# Patient Record
Sex: FEMALE | Race: WHITE
[De-identification: names, ages, dates, MRNs, and addresses within clinical notes are randomized per-mention and may not be internally consistent; named-entity substitution may affect disease eponyms.]

---

## 2017-01-09 ENCOUNTER — HOSPITAL ENCOUNTER (INPATIENT)
Dept: HOSPITAL 19 - LDRO | Age: 27
LOS: 30 days | Discharge: HOME | End: 2017-02-08
Attending: OBSTETRICS & GYNECOLOGY | Admitting: OBSTETRICS & GYNECOLOGY
Payer: COMMERCIAL

## 2017-01-09 VITALS — HEIGHT: 67.01 IN | BODY MASS INDEX: 31.18 KG/M2 | WEIGHT: 198.64 LBS

## 2017-01-09 DIAGNOSIS — Z3A.38: ICD-10-CM

## 2017-01-09 DIAGNOSIS — O09.293: ICD-10-CM

## 2017-02-06 VITALS — HEART RATE: 101 BPM | DIASTOLIC BLOOD PRESSURE: 62 MMHG | SYSTOLIC BLOOD PRESSURE: 116 MMHG | TEMPERATURE: 98 F

## 2017-02-06 VITALS — TEMPERATURE: 98.3 F | DIASTOLIC BLOOD PRESSURE: 83 MMHG | HEART RATE: 121 BPM | SYSTOLIC BLOOD PRESSURE: 123 MMHG

## 2017-02-06 VITALS — SYSTOLIC BLOOD PRESSURE: 128 MMHG | DIASTOLIC BLOOD PRESSURE: 81 MMHG | HEART RATE: 90 BPM

## 2017-02-06 VITALS — HEART RATE: 90 BPM | DIASTOLIC BLOOD PRESSURE: 78 MMHG | SYSTOLIC BLOOD PRESSURE: 111 MMHG

## 2017-02-06 VITALS — DIASTOLIC BLOOD PRESSURE: 61 MMHG | HEART RATE: 96 BPM | TEMPERATURE: 97.9 F | SYSTOLIC BLOOD PRESSURE: 113 MMHG

## 2017-02-06 VITALS — DIASTOLIC BLOOD PRESSURE: 78 MMHG | HEART RATE: 93 BPM | SYSTOLIC BLOOD PRESSURE: 120 MMHG

## 2017-02-06 VITALS — TEMPERATURE: 98 F | DIASTOLIC BLOOD PRESSURE: 85 MMHG | SYSTOLIC BLOOD PRESSURE: 129 MMHG | HEART RATE: 89 BPM

## 2017-02-06 VITALS — HEART RATE: 77 BPM | SYSTOLIC BLOOD PRESSURE: 127 MMHG | DIASTOLIC BLOOD PRESSURE: 81 MMHG

## 2017-02-06 VITALS — SYSTOLIC BLOOD PRESSURE: 130 MMHG | DIASTOLIC BLOOD PRESSURE: 78 MMHG | HEART RATE: 90 BPM

## 2017-02-06 VITALS — SYSTOLIC BLOOD PRESSURE: 121 MMHG | DIASTOLIC BLOOD PRESSURE: 79 MMHG | HEART RATE: 93 BPM

## 2017-02-06 VITALS — HEART RATE: 78 BPM | DIASTOLIC BLOOD PRESSURE: 72 MMHG | SYSTOLIC BLOOD PRESSURE: 115 MMHG

## 2017-02-06 VITALS — SYSTOLIC BLOOD PRESSURE: 103 MMHG | HEART RATE: 90 BPM | DIASTOLIC BLOOD PRESSURE: 71 MMHG

## 2017-02-06 VITALS — HEART RATE: 90 BPM | DIASTOLIC BLOOD PRESSURE: 75 MMHG | SYSTOLIC BLOOD PRESSURE: 121 MMHG

## 2017-02-06 VITALS — HEART RATE: 100 BPM | SYSTOLIC BLOOD PRESSURE: 117 MMHG | DIASTOLIC BLOOD PRESSURE: 80 MMHG

## 2017-02-06 VITALS — HEART RATE: 91 BPM | SYSTOLIC BLOOD PRESSURE: 124 MMHG | TEMPERATURE: 97.7 F | DIASTOLIC BLOOD PRESSURE: 80 MMHG

## 2017-02-06 VITALS — DIASTOLIC BLOOD PRESSURE: 67 MMHG | HEART RATE: 104 BPM | SYSTOLIC BLOOD PRESSURE: 134 MMHG

## 2017-02-06 VITALS — DIASTOLIC BLOOD PRESSURE: 65 MMHG | TEMPERATURE: 98.9 F | HEART RATE: 115 BPM | SYSTOLIC BLOOD PRESSURE: 101 MMHG

## 2017-02-06 VITALS — DIASTOLIC BLOOD PRESSURE: 73 MMHG | HEART RATE: 90 BPM | SYSTOLIC BLOOD PRESSURE: 124 MMHG

## 2017-02-06 VITALS — TEMPERATURE: 98.3 F | SYSTOLIC BLOOD PRESSURE: 123 MMHG | HEART RATE: 121 BPM | DIASTOLIC BLOOD PRESSURE: 83 MMHG

## 2017-02-06 VITALS — SYSTOLIC BLOOD PRESSURE: 128 MMHG | DIASTOLIC BLOOD PRESSURE: 70 MMHG | HEART RATE: 103 BPM

## 2017-02-06 VITALS — HEART RATE: 92 BPM | SYSTOLIC BLOOD PRESSURE: 127 MMHG | DIASTOLIC BLOOD PRESSURE: 80 MMHG

## 2017-02-06 VITALS — DIASTOLIC BLOOD PRESSURE: 77 MMHG | SYSTOLIC BLOOD PRESSURE: 118 MMHG | HEART RATE: 83 BPM

## 2017-02-06 VITALS — DIASTOLIC BLOOD PRESSURE: 83 MMHG | SYSTOLIC BLOOD PRESSURE: 121 MMHG | HEART RATE: 98 BPM

## 2017-02-06 VITALS — HEART RATE: 96 BPM | DIASTOLIC BLOOD PRESSURE: 73 MMHG | SYSTOLIC BLOOD PRESSURE: 124 MMHG

## 2017-02-06 VITALS — DIASTOLIC BLOOD PRESSURE: 75 MMHG | SYSTOLIC BLOOD PRESSURE: 121 MMHG | HEART RATE: 81 BPM

## 2017-02-06 VITALS — SYSTOLIC BLOOD PRESSURE: 119 MMHG | HEART RATE: 99 BPM | DIASTOLIC BLOOD PRESSURE: 81 MMHG

## 2017-02-06 VITALS — SYSTOLIC BLOOD PRESSURE: 115 MMHG | DIASTOLIC BLOOD PRESSURE: 74 MMHG | HEART RATE: 81 BPM

## 2017-02-06 VITALS — HEART RATE: 101 BPM | DIASTOLIC BLOOD PRESSURE: 77 MMHG | SYSTOLIC BLOOD PRESSURE: 117 MMHG

## 2017-02-06 VITALS — SYSTOLIC BLOOD PRESSURE: 116 MMHG | DIASTOLIC BLOOD PRESSURE: 74 MMHG | HEART RATE: 94 BPM

## 2017-02-06 VITALS — HEART RATE: 88 BPM | SYSTOLIC BLOOD PRESSURE: 109 MMHG | DIASTOLIC BLOOD PRESSURE: 74 MMHG | TEMPERATURE: 97.4 F

## 2017-02-06 VITALS — DIASTOLIC BLOOD PRESSURE: 76 MMHG | HEART RATE: 91 BPM | SYSTOLIC BLOOD PRESSURE: 119 MMHG

## 2017-02-06 VITALS — SYSTOLIC BLOOD PRESSURE: 114 MMHG | HEART RATE: 87 BPM | DIASTOLIC BLOOD PRESSURE: 74 MMHG

## 2017-02-06 VITALS — DIASTOLIC BLOOD PRESSURE: 63 MMHG | SYSTOLIC BLOOD PRESSURE: 137 MMHG | TEMPERATURE: 98 F | HEART RATE: 88 BPM

## 2017-02-06 VITALS — DIASTOLIC BLOOD PRESSURE: 75 MMHG | SYSTOLIC BLOOD PRESSURE: 129 MMHG | HEART RATE: 82 BPM

## 2017-02-06 LAB
BASOPHILS # BLD: 0 10*3/UL (ref 0–0.2)
BASOPHILS NFR BLD AUTO: 0.4 % (ref 0–2)
EOSINOPHIL # BLD: 0.1 10*3/UL (ref 0–0.7)
EOSINOPHIL NFR BLD: 1.2 % (ref 0–4)
ERYTHROCYTE [DISTWIDTH] IN BLOOD BY AUTOMATED COUNT: 13.6 % (ref 11.5–14.5)
GRANULOCYTES # BLD AUTO: 67.1 % (ref 42.2–75.2)
HCT VFR BLD AUTO: 36.6 % (ref 37–47)
HGB BLD-MCNC: 12.7 G/DL (ref 12.5–16)
LYMPHOCYTES # BLD: 1.9 10*3/UL (ref 1.2–3.4)
LYMPHOCYTES NFR BLD: 20.3 % (ref 20–51)
MCH RBC QN AUTO: 33 PG (ref 27–31)
MCHC RBC AUTO-ENTMCNC: 35 G/DL (ref 33–37)
MCV RBC AUTO: 95 FL (ref 80–100)
MONOCYTES # BLD: 0.9 10*3/UL (ref 0.1–0.6)
MONOCYTES NFR BLD AUTO: 9.8 % (ref 1.7–9.3)
NEUTROPHILS # BLD: 6.2 10*3/UL (ref 1.4–6.5)
PLATELET # BLD AUTO: 187 K/MM3 (ref 130–400)
PMV BLD AUTO: 11.4 FL (ref 7.4–10.4)
RBC # BLD AUTO: 3.86 M/MM3 (ref 4.1–5.3)
WBC # BLD AUTO: 9.2 K/MM3 (ref 4.8–10.8)

## 2017-02-06 PROCEDURE — 0HQ9XZZ REPAIR PERINEUM SKIN, EXTERNAL APPROACH: ICD-10-PCS | Performed by: OBSTETRICS & GYNECOLOGY

## 2017-02-06 PROCEDURE — 3E033VJ INTRODUCTION OF OTHER HORMONE INTO PERIPHERAL VEIN, PERCUTANEOUS APPROACH: ICD-10-PCS | Performed by: OBSTETRICS & GYNECOLOGY

## 2017-02-07 VITALS — TEMPERATURE: 97.8 F | HEART RATE: 92 BPM | SYSTOLIC BLOOD PRESSURE: 114 MMHG | DIASTOLIC BLOOD PRESSURE: 68 MMHG

## 2017-02-07 VITALS — DIASTOLIC BLOOD PRESSURE: 79 MMHG | SYSTOLIC BLOOD PRESSURE: 106 MMHG | TEMPERATURE: 98.1 F | HEART RATE: 84 BPM

## 2017-02-07 VITALS — TEMPERATURE: 97.8 F | SYSTOLIC BLOOD PRESSURE: 110 MMHG | HEART RATE: 85 BPM | DIASTOLIC BLOOD PRESSURE: 79 MMHG

## 2017-02-07 VITALS — SYSTOLIC BLOOD PRESSURE: 109 MMHG | DIASTOLIC BLOOD PRESSURE: 68 MMHG | TEMPERATURE: 97.9 F | HEART RATE: 87 BPM

## 2017-02-07 VITALS — TEMPERATURE: 98.2 F | DIASTOLIC BLOOD PRESSURE: 75 MMHG | SYSTOLIC BLOOD PRESSURE: 102 MMHG | HEART RATE: 83 BPM

## 2017-02-08 VITALS — HEART RATE: 104 BPM | DIASTOLIC BLOOD PRESSURE: 73 MMHG | TEMPERATURE: 98.2 F | SYSTOLIC BLOOD PRESSURE: 98 MMHG

## 2018-12-16 ENCOUNTER — HOSPITAL ENCOUNTER (OUTPATIENT)
Dept: HOSPITAL 19 - LDRO | Age: 28
Discharge: HOME | End: 2018-12-16
Attending: OBSTETRICS & GYNECOLOGY
Payer: COMMERCIAL

## 2018-12-16 VITALS — HEART RATE: 108 BPM | DIASTOLIC BLOOD PRESSURE: 77 MMHG | SYSTOLIC BLOOD PRESSURE: 117 MMHG

## 2018-12-16 VITALS — DIASTOLIC BLOOD PRESSURE: 76 MMHG | SYSTOLIC BLOOD PRESSURE: 113 MMHG | HEART RATE: 116 BPM | TEMPERATURE: 98.3 F

## 2018-12-16 VITALS — HEIGHT: 67.01 IN | WEIGHT: 187.39 LBS | BODY MASS INDEX: 29.41 KG/M2

## 2018-12-16 VITALS — DIASTOLIC BLOOD PRESSURE: 65 MMHG | TEMPERATURE: 99.3 F | SYSTOLIC BLOOD PRESSURE: 114 MMHG | HEART RATE: 118 BPM

## 2018-12-16 VITALS — HEART RATE: 136 BPM | SYSTOLIC BLOOD PRESSURE: 109 MMHG | DIASTOLIC BLOOD PRESSURE: 76 MMHG

## 2018-12-16 VITALS — HEART RATE: 116 BPM | TEMPERATURE: 98.3 F | DIASTOLIC BLOOD PRESSURE: 76 MMHG | SYSTOLIC BLOOD PRESSURE: 113 MMHG

## 2018-12-16 VITALS — SYSTOLIC BLOOD PRESSURE: 115 MMHG | DIASTOLIC BLOOD PRESSURE: 73 MMHG | HEART RATE: 118 BPM

## 2018-12-16 VITALS — DIASTOLIC BLOOD PRESSURE: 75 MMHG | SYSTOLIC BLOOD PRESSURE: 115 MMHG | HEART RATE: 112 BPM

## 2018-12-16 VITALS — DIASTOLIC BLOOD PRESSURE: 73 MMHG | HEART RATE: 118 BPM | SYSTOLIC BLOOD PRESSURE: 115 MMHG

## 2018-12-16 DIAGNOSIS — Z3A.37: ICD-10-CM

## 2018-12-16 DIAGNOSIS — O26.893: Primary | ICD-10-CM

## 2018-12-16 DIAGNOSIS — R11.2: ICD-10-CM

## 2018-12-16 DIAGNOSIS — R19.7: ICD-10-CM

## 2018-12-16 LAB
ALBUMIN SERPL-MCNC: 3.6 GM/DL (ref 3.5–5)
ALP SERPL-CCNC: 152 U/L (ref 50–136)
ALT SERPL-CCNC: 15 U/L (ref 9–52)
ANION GAP SERPL CALC-SCNC: 8 MMOL/L (ref 7–16)
AST SERPL-CCNC: 29 U/L (ref 15–37)
BILIRUB SERPL-MCNC: 0.8 MG/DL (ref 0–1)
BUN SERPL-MCNC: 8 MG/DL (ref 7–17)
CALCIUM SERPL-MCNC: 8.5 MG/DL (ref 8.4–10.2)
CHLORIDE SERPL-SCNC: 108 MMOL/L (ref 98–107)
CO2 SERPL-SCNC: 22 MMOL/L (ref 22–30)
CREAT SERPL-SCNC: 0.49 MG/DL (ref 0.52–1.25)
ERYTHROCYTE [DISTWIDTH] IN BLOOD BY AUTOMATED COUNT: 13.6 % (ref 11.5–14.5)
GLUCOSE SERPL-MCNC: 93 MG/DL (ref 74–106)
HCT VFR BLD AUTO: 37.8 % (ref 37–47)
HGB BLD-MCNC: 12.6 G/DL (ref 12.5–16)
KETONES UR STRIP.AUTO-MCNC: (no result) MG/DL
LYMPHOCYTES NFR BLD MANUAL: 2 % (ref 20–51)
MCH RBC QN AUTO: 33 PG (ref 27–31)
MCHC RBC AUTO-ENTMCNC: 33 G/DL (ref 33–37)
MCV RBC AUTO: 99 FL (ref 80–100)
METAMYELOCYTES NFR BLD MANUAL: 2 % (ref 0–0)
MONOCYTES NFR BLD: 1 % (ref 1.7–9.3)
NEUTS BAND NFR BLD: 23 % (ref 0–10)
NEUTS SEG NFR BLD MANUAL: 72 % (ref 42–75.2)
PH UR STRIP.AUTO: 7 [PH] (ref 5–8)
PLATELET # BLD AUTO: 192 K/MM3 (ref 130–400)
PLATELET BLD QL SMEAR: NORMAL
PMV BLD AUTO: 11 FL (ref 7.4–10.4)
POTASSIUM SERPL-SCNC: 3.9 MMOL/L (ref 3.4–5)
PROT SERPL-MCNC: 6.8 GM/DL (ref 6.4–8.2)
RBC # BLD AUTO: 3.81 M/MM3 (ref 4.1–5.3)
RBC # UR: (no result) /HPF
SODIUM SERPL-SCNC: 138 MMOL/L (ref 137–145)
SP GR UR STRIP.AUTO: 1.02 (ref 1–1.03)
UA DIPSTICK PNL UR STRIP.AUTO: (no result)
URN COLLECT METHOD CLASS: (no result)

## 2018-12-20 ENCOUNTER — HOSPITAL ENCOUNTER (INPATIENT)
Dept: HOSPITAL 19 - LDRO | Age: 28
LOS: 1 days | Discharge: HOME | End: 2018-12-21
Attending: OBSTETRICS & GYNECOLOGY | Admitting: OBSTETRICS & GYNECOLOGY
Payer: COMMERCIAL

## 2018-12-20 VITALS — HEART RATE: 76 BPM | DIASTOLIC BLOOD PRESSURE: 71 MMHG | SYSTOLIC BLOOD PRESSURE: 114 MMHG

## 2018-12-20 VITALS — HEART RATE: 76 BPM | SYSTOLIC BLOOD PRESSURE: 114 MMHG | DIASTOLIC BLOOD PRESSURE: 71 MMHG

## 2018-12-20 VITALS — DIASTOLIC BLOOD PRESSURE: 66 MMHG | HEART RATE: 66 BPM | SYSTOLIC BLOOD PRESSURE: 107 MMHG

## 2018-12-20 VITALS — HEART RATE: 78 BPM | DIASTOLIC BLOOD PRESSURE: 68 MMHG | TEMPERATURE: 98.3 F | SYSTOLIC BLOOD PRESSURE: 120 MMHG

## 2018-12-20 VITALS — DIASTOLIC BLOOD PRESSURE: 78 MMHG | HEART RATE: 83 BPM | SYSTOLIC BLOOD PRESSURE: 118 MMHG

## 2018-12-20 VITALS — DIASTOLIC BLOOD PRESSURE: 90 MMHG | SYSTOLIC BLOOD PRESSURE: 134 MMHG | HEART RATE: 69 BPM | TEMPERATURE: 97.7 F

## 2018-12-20 VITALS — SYSTOLIC BLOOD PRESSURE: 118 MMHG | HEART RATE: 83 BPM | DIASTOLIC BLOOD PRESSURE: 78 MMHG

## 2018-12-20 VITALS — DIASTOLIC BLOOD PRESSURE: 78 MMHG | SYSTOLIC BLOOD PRESSURE: 114 MMHG | HEART RATE: 61 BPM

## 2018-12-20 VITALS — SYSTOLIC BLOOD PRESSURE: 116 MMHG | DIASTOLIC BLOOD PRESSURE: 77 MMHG | HEART RATE: 68 BPM

## 2018-12-20 VITALS — SYSTOLIC BLOOD PRESSURE: 116 MMHG | DIASTOLIC BLOOD PRESSURE: 67 MMHG | HEART RATE: 93 BPM

## 2018-12-20 VITALS — SYSTOLIC BLOOD PRESSURE: 128 MMHG | HEART RATE: 102 BPM | DIASTOLIC BLOOD PRESSURE: 62 MMHG

## 2018-12-20 VITALS — HEART RATE: 73 BPM | DIASTOLIC BLOOD PRESSURE: 67 MMHG | SYSTOLIC BLOOD PRESSURE: 107 MMHG

## 2018-12-20 VITALS — HEART RATE: 98 BPM | DIASTOLIC BLOOD PRESSURE: 67 MMHG | SYSTOLIC BLOOD PRESSURE: 116 MMHG

## 2018-12-20 VITALS — HEART RATE: 75 BPM | SYSTOLIC BLOOD PRESSURE: 111 MMHG | DIASTOLIC BLOOD PRESSURE: 75 MMHG

## 2018-12-20 VITALS — HEART RATE: 68 BPM | DIASTOLIC BLOOD PRESSURE: 64 MMHG | SYSTOLIC BLOOD PRESSURE: 106 MMHG

## 2018-12-20 VITALS — SYSTOLIC BLOOD PRESSURE: 107 MMHG | HEART RATE: 76 BPM | DIASTOLIC BLOOD PRESSURE: 71 MMHG

## 2018-12-20 VITALS — SYSTOLIC BLOOD PRESSURE: 156 MMHG | HEART RATE: 68 BPM | DIASTOLIC BLOOD PRESSURE: 87 MMHG

## 2018-12-20 VITALS — HEART RATE: 67 BPM | DIASTOLIC BLOOD PRESSURE: 68 MMHG | SYSTOLIC BLOOD PRESSURE: 112 MMHG

## 2018-12-20 VITALS — SYSTOLIC BLOOD PRESSURE: 117 MMHG | HEART RATE: 76 BPM | DIASTOLIC BLOOD PRESSURE: 79 MMHG

## 2018-12-20 VITALS — HEART RATE: 66 BPM | SYSTOLIC BLOOD PRESSURE: 112 MMHG | DIASTOLIC BLOOD PRESSURE: 64 MMHG

## 2018-12-20 VITALS — SYSTOLIC BLOOD PRESSURE: 146 MMHG | HEART RATE: 101 BPM | DIASTOLIC BLOOD PRESSURE: 87 MMHG

## 2018-12-20 VITALS — DIASTOLIC BLOOD PRESSURE: 68 MMHG | SYSTOLIC BLOOD PRESSURE: 113 MMHG | TEMPERATURE: 98.1 F | HEART RATE: 78 BPM

## 2018-12-20 VITALS — SYSTOLIC BLOOD PRESSURE: 114 MMHG | TEMPERATURE: 98.2 F | DIASTOLIC BLOOD PRESSURE: 78 MMHG | HEART RATE: 83 BPM

## 2018-12-20 VITALS — WEIGHT: 184.31 LBS | BODY MASS INDEX: 28.93 KG/M2 | HEIGHT: 67.01 IN

## 2018-12-20 VITALS — DIASTOLIC BLOOD PRESSURE: 74 MMHG | SYSTOLIC BLOOD PRESSURE: 116 MMHG | TEMPERATURE: 97.8 F | HEART RATE: 80 BPM

## 2018-12-20 VITALS — HEART RATE: 71 BPM | DIASTOLIC BLOOD PRESSURE: 72 MMHG | SYSTOLIC BLOOD PRESSURE: 120 MMHG

## 2018-12-20 DIAGNOSIS — Z3A.38: ICD-10-CM

## 2018-12-20 LAB
BASOPHILS # BLD: 0 10*3/UL (ref 0–0.2)
BASOPHILS NFR BLD AUTO: 0.3 % (ref 0–2)
EOSINOPHIL # BLD: 0 10*3/UL (ref 0–0.7)
EOSINOPHIL NFR BLD: 0.7 % (ref 0–4)
ERYTHROCYTE [DISTWIDTH] IN BLOOD BY AUTOMATED COUNT: 13.5 % (ref 11.5–14.5)
GRANULOCYTES # BLD AUTO: 58.2 % (ref 42.2–75.2)
HCT VFR BLD AUTO: 33.4 % (ref 37–47)
HGB BLD-MCNC: 11.5 G/DL (ref 12.5–16)
LYMPHOCYTES # BLD: 1.8 10*3/UL (ref 1.2–3.4)
LYMPHOCYTES NFR BLD: 30.1 % (ref 20–51)
MCH RBC QN AUTO: 33 PG (ref 27–31)
MCHC RBC AUTO-ENTMCNC: 34 G/DL (ref 33–37)
MCV RBC AUTO: 97 FL (ref 80–100)
MONOCYTES # BLD: 0.6 10*3/UL (ref 0.1–0.6)
MONOCYTES NFR BLD AUTO: 10.2 % (ref 1.7–9.3)
NEUTROPHILS # BLD: 3.4 10*3/UL (ref 1.4–6.5)
PLATELET # BLD AUTO: 196 K/MM3 (ref 130–400)
PMV BLD AUTO: 10.9 FL (ref 7.4–10.4)
RBC # BLD AUTO: 3.46 M/MM3 (ref 4.1–5.3)

## 2018-12-20 PROCEDURE — 0HQ9XZZ REPAIR PERINEUM SKIN, EXTERNAL APPROACH: ICD-10-PCS | Performed by: OBSTETRICS & GYNECOLOGY

## 2018-12-21 VITALS — SYSTOLIC BLOOD PRESSURE: 103 MMHG | TEMPERATURE: 97.6 F | HEART RATE: 69 BPM | DIASTOLIC BLOOD PRESSURE: 69 MMHG

## 2018-12-21 VITALS — HEART RATE: 77 BPM | DIASTOLIC BLOOD PRESSURE: 69 MMHG | TEMPERATURE: 97.9 F | SYSTOLIC BLOOD PRESSURE: 106 MMHG

## 2018-12-21 VITALS — HEART RATE: 85 BPM | DIASTOLIC BLOOD PRESSURE: 67 MMHG | SYSTOLIC BLOOD PRESSURE: 108 MMHG | TEMPERATURE: 98.2 F

## 2019-01-04 ENCOUNTER — HOSPITAL ENCOUNTER (EMERGENCY)
Dept: HOSPITAL 19 - COL.ER | Age: 29
LOS: 1 days | Discharge: HOME | End: 2019-01-05
Payer: COMMERCIAL

## 2019-01-04 VITALS — BODY MASS INDEX: 25.76 KG/M2 | HEIGHT: 65.98 IN | WEIGHT: 160.28 LBS

## 2019-01-04 DIAGNOSIS — K59.00: Primary | ICD-10-CM

## 2019-01-04 DIAGNOSIS — N13.30: ICD-10-CM

## 2019-01-04 DIAGNOSIS — N12: ICD-10-CM

## 2019-01-04 LAB — URN COLLECT METHOD CLASS: (no result)

## 2019-01-05 VITALS — HEART RATE: 92 BPM | TEMPERATURE: 98.2 F | DIASTOLIC BLOOD PRESSURE: 69 MMHG | SYSTOLIC BLOOD PRESSURE: 106 MMHG

## 2019-01-05 LAB
ALBUMIN SERPL-MCNC: 4.1 GM/DL (ref 3.5–5)
ALP SERPL-CCNC: 135 U/L (ref 50–136)
ALT SERPL-CCNC: 17 U/L (ref 9–52)
ANION GAP SERPL CALC-SCNC: 9 MMOL/L (ref 7–16)
AST SERPL-CCNC: 24 U/L (ref 15–37)
BASOPHILS # BLD: 0.1 10*3/UL (ref 0–0.2)
BASOPHILS NFR BLD AUTO: 0.5 % (ref 0–2)
BILIRUB SERPL-MCNC: 0.3 MG/DL (ref 0–1)
BUN SERPL-MCNC: 15 MG/DL (ref 7–17)
CALCIUM SERPL-MCNC: 9.1 MG/DL (ref 8.4–10.2)
CHLORIDE SERPL-SCNC: 105 MMOL/L (ref 98–107)
CO2 SERPL-SCNC: 28 MMOL/L (ref 22–30)
CREAT SERPL-SCNC: 0.77 MG/DL (ref 0.52–1.25)
EOSINOPHIL # BLD: 0.1 10*3/UL (ref 0–0.7)
EOSINOPHIL NFR BLD: 0.7 % (ref 0–4)
ERYTHROCYTE [DISTWIDTH] IN BLOOD BY AUTOMATED COUNT: 12.6 % (ref 11.5–14.5)
GLUCOSE SERPL-MCNC: 107 MG/DL (ref 74–106)
GRANULOCYTES # BLD AUTO: 78.3 % (ref 42.2–75.2)
HCT VFR BLD AUTO: 39.6 % (ref 37–47)
HGB BLD-MCNC: 13 G/DL (ref 12.5–16)
LYMPHOCYTES # BLD: 1.9 10*3/UL (ref 1.2–3.4)
LYMPHOCYTES NFR BLD: 12.2 % (ref 20–51)
MCH RBC QN AUTO: 32 PG (ref 27–31)
MCHC RBC AUTO-ENTMCNC: 33 G/DL (ref 33–37)
MCV RBC AUTO: 99 FL (ref 80–100)
MONOCYTES # BLD: 1.2 10*3/UL (ref 0.1–0.6)
MONOCYTES NFR BLD AUTO: 7.8 % (ref 1.7–9.3)
NEUTROPHILS # BLD: 11.9 10*3/UL (ref 1.4–6.5)
PH UR STRIP.AUTO: 6 [PH] (ref 5–8)
PLATELET # BLD AUTO: 316 K/MM3 (ref 130–400)
PMV BLD AUTO: 10.2 FL (ref 7.4–10.4)
POTASSIUM SERPL-SCNC: 4 MMOL/L (ref 3.4–5)
PROT SERPL-MCNC: 7.5 GM/DL (ref 6.4–8.2)
RBC # BLD AUTO: 4.01 M/MM3 (ref 4.1–5.3)
RBC # UR STRIP.AUTO: (no result) /UL
RBC # UR: >50 /HPF
SODIUM SERPL-SCNC: 142 MMOL/L (ref 137–145)
SP GR UR STRIP.AUTO: 1.01 (ref 1–1.03)
SQUAMOUS # URNS: (no result) /HPF
UA DIPSTICK PNL UR STRIP.AUTO: (no result)
URINE LEUKOCYTE ESTERASE: (no result)
WBC # UR: >50 /HPF

## 2019-12-04 ENCOUNTER — HOSPITAL ENCOUNTER (OUTPATIENT)
Dept: HOSPITAL 19 - COL.RAD | Age: 29
Discharge: HOME | End: 2019-12-04
Attending: FAMILY MEDICINE
Payer: COMMERCIAL

## 2019-12-04 DIAGNOSIS — R10.9: Primary | ICD-10-CM

## 2020-04-28 ENCOUNTER — HOSPITAL ENCOUNTER (OUTPATIENT)
Dept: HOSPITAL 19 - LDRO | Age: 30
End: 2020-04-28
Attending: FAMILY MEDICINE
Payer: COMMERCIAL

## 2020-04-28 DIAGNOSIS — K59.00: ICD-10-CM

## 2020-04-28 DIAGNOSIS — Z00.00: Primary | ICD-10-CM

## 2020-04-28 DIAGNOSIS — F33.1: ICD-10-CM

## 2020-04-28 LAB
ALBUMIN SERPL-MCNC: 4.7 GM/DL (ref 3.5–5)
ALP SERPL-CCNC: 83 U/L (ref 50–136)
ALT SERPL-CCNC: 14 U/L (ref 4–34)
ANION GAP SERPL CALC-SCNC: 10 MMOL/L (ref 7–16)
AST SERPL-CCNC: 27 U/L (ref 15–37)
BILIRUB SERPL-MCNC: 0.4 MG/DL (ref 0–1)
BUN SERPL-MCNC: 18 MG/DL (ref 7–17)
CALCIUM SERPL-MCNC: 9.3 MG/DL (ref 8.4–10.2)
CHLORIDE SERPL-SCNC: 104 MMOL/L (ref 98–107)
CHOLEST SPEC-SCNC: 131 MG/DL (ref 120–200)
CHOLEST/HDLC SERPL-SRTO: 3.3
CO2 SERPL-SCNC: 26 MMOL/L (ref 22–30)
CREAT SERPL-SCNC: 0.65 UMOL/L (ref 0.52–1.25)
ERYTHROCYTE [DISTWIDTH] IN BLOOD BY AUTOMATED COUNT: 13.1 % (ref 11.5–14.5)
GLUCOSE SERPL-MCNC: 80 MG/DL (ref 74–106)
HCT VFR BLD AUTO: 38.6 % (ref 37–47)
HDLC SERPL-MCNC: 39 MG/DL
HGB BLD-MCNC: 13.1 G/DL (ref 12.5–16)
LDLC SERPL-MCNC: 71 MG/DL
MCH RBC QN AUTO: 32 PG (ref 27–31)
MCHC RBC AUTO-ENTMCNC: 34 G/DL (ref 33–37)
MCV RBC AUTO: 93 FL (ref 80–100)
PLATELET # BLD AUTO: 245 K/MM3 (ref 130–400)
PMV BLD AUTO: 10.7 FL (ref 7.4–10.4)
POTASSIUM SERPL-SCNC: 3.6 MMOL/L (ref 3.4–5)
PROT SERPL-MCNC: 7.9 GM/DL (ref 6.4–8.2)
RBC # BLD AUTO: 4.14 M/MM3 (ref 4.1–5.3)
SODIUM SERPL-SCNC: 140 MMOL/L (ref 137–145)
TRIGL SERPL-MCNC: 106 MG/DL
TSH SERPL DL<=0.005 MIU/L-ACNC: 2.51 UIU/ML (ref 0.47–4.68)

## 2021-01-05 ENCOUNTER — HOSPITAL ENCOUNTER (OUTPATIENT)
Dept: HOSPITAL 19 - COL.LAB | Age: 31
End: 2021-01-05
Attending: INTERNAL MEDICINE
Payer: COMMERCIAL

## 2021-01-05 DIAGNOSIS — R10.9: ICD-10-CM

## 2021-01-05 DIAGNOSIS — K59.00: Primary | ICD-10-CM

## 2021-01-05 DIAGNOSIS — R19.8: ICD-10-CM

## 2021-07-16 ENCOUNTER — HOSPITAL ENCOUNTER (OUTPATIENT)
Dept: HOSPITAL 19 - COL.RAD | Age: 31
End: 2021-07-16
Attending: FAMILY MEDICINE
Payer: COMMERCIAL

## 2021-07-16 DIAGNOSIS — M51.26: Primary | ICD-10-CM

## 2021-07-16 DIAGNOSIS — M48.061: ICD-10-CM

## 2021-11-03 ENCOUNTER — HOSPITAL ENCOUNTER (OUTPATIENT)
Dept: HOSPITAL 19 - SDCO | Age: 31
End: 2021-11-03
Attending: INTERNAL MEDICINE
Payer: COMMERCIAL

## 2021-11-03 VITALS — WEIGHT: 178.79 LBS | BODY MASS INDEX: 28.06 KG/M2 | HEIGHT: 67 IN

## 2021-11-03 VITALS — HEART RATE: 77 BPM | SYSTOLIC BLOOD PRESSURE: 102 MMHG | TEMPERATURE: 98 F | DIASTOLIC BLOOD PRESSURE: 66 MMHG

## 2021-11-03 VITALS — DIASTOLIC BLOOD PRESSURE: 63 MMHG | SYSTOLIC BLOOD PRESSURE: 94 MMHG | HEART RATE: 75 BPM

## 2021-11-03 VITALS — HEART RATE: 70 BPM | SYSTOLIC BLOOD PRESSURE: 106 MMHG | DIASTOLIC BLOOD PRESSURE: 71 MMHG

## 2021-11-03 VITALS — DIASTOLIC BLOOD PRESSURE: 69 MMHG | SYSTOLIC BLOOD PRESSURE: 105 MMHG | HEART RATE: 68 BPM

## 2021-11-03 VITALS — HEART RATE: 91 BPM | DIASTOLIC BLOOD PRESSURE: 81 MMHG | TEMPERATURE: 98.3 F | SYSTOLIC BLOOD PRESSURE: 110 MMHG

## 2021-11-03 DIAGNOSIS — N81.10: ICD-10-CM

## 2021-11-03 DIAGNOSIS — K59.00: Primary | ICD-10-CM

## 2021-11-03 DIAGNOSIS — R19.5: ICD-10-CM

## 2021-11-03 DIAGNOSIS — N81.6: ICD-10-CM

## 2021-11-03 DIAGNOSIS — Z79.899: ICD-10-CM

## 2021-11-03 DIAGNOSIS — F41.9: ICD-10-CM

## 2021-11-03 NOTE — NUR
0852  Pt returns from endo procedure via cart and RN assist to [facility].  Pt
ambulates from cart to recliner with RN assist.  Monitors on and alarms set.
Call light within reach.  Pt care continued with this RN.  Pt alert and
oriented.  Pt requests juice and muffin.  Pt denies any pain or nausea.
 
0900  Pt taking food and drink well.  No complications noted.
 
0920  Discharge instructions given to pt.  All questions answered to her
satisfaction.  Handed to pt is discharge information.
 
0930  Pt transferred out of the hospital via wheelchair and this RN assist, to
private vehicle driven by pt's .

## 2022-03-10 ENCOUNTER — HOSPITAL ENCOUNTER (OUTPATIENT)
Dept: HOSPITAL 19 - COL.LAB | Age: 32
End: 2022-03-10
Attending: FAMILY MEDICINE
Payer: COMMERCIAL

## 2022-03-10 DIAGNOSIS — Z00.00: Primary | ICD-10-CM

## 2022-03-10 DIAGNOSIS — R63.4: ICD-10-CM

## 2022-03-10 DIAGNOSIS — N92.1: ICD-10-CM

## 2022-03-10 DIAGNOSIS — K59.00: ICD-10-CM

## 2022-03-10 DIAGNOSIS — R25.1: ICD-10-CM

## 2022-03-10 LAB
ALBUMIN SERPL-MCNC: 4.5 GM/DL (ref 3.5–5)
ALP SERPL-CCNC: 50 U/L (ref 40–150)
ALT SERPL-CCNC: 13 U/L (ref 0–55)
ANION GAP SERPL CALC-SCNC: 6 MMOL/L (ref 7–16)
AST SERPL-CCNC: 17 U/L (ref 5–34)
BASOPHILS # BLD: 0 K/MM3 (ref 0–0.2)
BASOPHILS NFR BLD AUTO: 0.5 % (ref 0–2)
BILIRUB SERPL-MCNC: 0.8 MG/DL (ref 0.2–1.2)
BUN SERPL-MCNC: 10 MG/DL (ref 7–19)
CALCIUM SERPL-MCNC: 9.2 MG/DL (ref 8.4–10.2)
CHLORIDE SERPL-SCNC: 105 MMOL/L (ref 98–107)
CHOLEST SPEC-SCNC: 149 MG/DL (ref 0–199)
CHOLEST/HDLC SERPL-SRTO: 3.5
CO2 SERPL-SCNC: 26 MMOL/L (ref 22–29)
CREAT SERPL-SCNC: 0.81 MG/DL (ref 0.57–1.11)
EOSINOPHIL # BLD: 0.1 K/MM3 (ref 0–0.7)
EOSINOPHIL NFR BLD: 1.2 % (ref 0–4)
ERYTHROCYTE [DISTWIDTH] IN BLOOD BY AUTOMATED COUNT: 12.9 % (ref 11.5–14.5)
GLUCOSE SERPL-MCNC: 91 MG/DL (ref 70–99)
GRANULOCYTES # BLD AUTO: 55.1 % (ref 42.2–75.2)
HCT VFR BLD AUTO: 41.8 % (ref 37–47)
HDLC SERPL-MCNC: 42 MG/DL (ref 40–60)
HGB BLD-MCNC: 14 G/DL (ref 12.5–16)
LDLC SERPL-MCNC: 86 MG/DL
LYMPHOCYTES # BLD: 2.2 K/MM3 (ref 1.2–3.4)
LYMPHOCYTES NFR BLD: 35.6 % (ref 20–51)
MCH RBC QN AUTO: 31 PG (ref 27–31)
MCHC RBC AUTO-ENTMCNC: 34 G/DL (ref 33–37)
MCV RBC AUTO: 93 FL (ref 80–100)
MONOCYTES # BLD: 0.5 K/MM3 (ref 0.1–0.6)
MONOCYTES NFR BLD AUTO: 7.4 % (ref 1.7–9.3)
NEUTROPHILS # BLD: 3.4 K/MM3 (ref 1.4–6.5)
PLATELET # BLD AUTO: 271 K/MM3 (ref 130–400)
PMV BLD AUTO: 10.3 FL (ref 7.4–10.4)
POTASSIUM SERPL-SCNC: 4.3 MMOL/L (ref 3.5–4.5)
PROT SERPL-MCNC: 7.9 GM/DL (ref 6.2–8.1)
RBC # BLD AUTO: 4.48 M/MM3 (ref 4.1–5.3)
SODIUM SERPL-SCNC: 137 MMOL/L (ref 136–145)
TRIGL SERPL-MCNC: 103 MG/DL (ref 0–149)
TSH SERPL DL<=0.005 MIU/L-ACNC: 2.82 UIU/ML (ref 0.35–4.94)

## 2022-09-21 ENCOUNTER — HOSPITAL ENCOUNTER (OUTPATIENT)
Dept: HOSPITAL 19 - COL.RAD | Age: 32
End: 2022-09-21
Attending: FAMILY MEDICINE
Payer: COMMERCIAL

## 2022-09-21 DIAGNOSIS — N81.6: Primary | ICD-10-CM

## 2022-09-21 PROCEDURE — A9575 INJ GADOTERATE MEGLUMI 0.1ML: HCPCS

## 2023-11-02 ENCOUNTER — HOSPITAL ENCOUNTER (OUTPATIENT)
Dept: HOSPITAL 19 - COL.RAD | Age: 33
End: 2023-11-02
Attending: FAMILY MEDICINE
Payer: COMMERCIAL

## 2023-11-02 DIAGNOSIS — K80.20: Primary | ICD-10-CM

## 2024-08-15 ENCOUNTER — HOSPITAL ENCOUNTER (OUTPATIENT)
Dept: HOSPITAL 6 - LAB | Age: 34
End: 2024-08-15
Attending: NURSE PRACTITIONER
Payer: COMMERCIAL

## 2024-08-15 DIAGNOSIS — N89.8: Primary | ICD-10-CM

## 2024-08-15 LAB — CLUE CELLS VAG QL WET PREP: PRESENT

## 2024-08-28 ENCOUNTER — HOSPITAL ENCOUNTER (OUTPATIENT)
Dept: HOSPITAL 6 - RAD | Age: 34
End: 2024-08-28
Attending: NURSE PRACTITIONER
Payer: COMMERCIAL

## 2024-08-28 DIAGNOSIS — N92.0: Primary | ICD-10-CM

## 2024-08-30 ENCOUNTER — HOSPITAL ENCOUNTER (OUTPATIENT)
Dept: HOSPITAL 6 - LAB | Age: 34
End: 2024-08-30
Attending: NURSE PRACTITIONER
Payer: COMMERCIAL

## 2024-08-30 DIAGNOSIS — R53.83: ICD-10-CM

## 2024-08-30 DIAGNOSIS — Z13.220: ICD-10-CM

## 2024-08-30 DIAGNOSIS — Z00.00: Primary | ICD-10-CM

## 2024-08-30 LAB
ALBUMIN SERPL-MCNC: 4.5 G/DL (ref 3.5–5)
ALT SERPL-CCNC: 10 U/L (ref 0–55)
AST SERPL-CCNC: 17 U/L (ref 5–34)
BASOPHILS # BLD: 0 K/MM3 (ref 0.02–0.1)
BILIRUB SERPL-MCNC: 0.6 MG/DL (ref 0.2–1.2)
CALCIUM SERPL-MCNC: 9.7 MG/DL (ref 8.3–10.5)
CO2 SERPL-SCNC: 20 MMOL/L (ref 22–29)
EOSINOPHIL # BLD: 0 K/MM3 (ref 0.04–0.4)
EOSINOPHIL NFR BLD: 0 % (ref 1–5)
ERYTHROCYTE [DISTWIDTH] IN BLOOD BY AUTOMATED COUNT: 12.7 % (ref 11.5–14.5)
GLUCOSE SERPL-MCNC: 97 MG/DL (ref 65–105)
HCT VFR BLD AUTO: 39.9 % (ref 37–47)
HGB BLD-MCNC: 13.2 G/DL (ref 12.5–16)
LYMPHOCYTES # BLD: 1.51 K/MM3 (ref 1.5–4)
MCH RBC QN AUTO: 31 PG (ref 27–31)
MCHC RBC AUTO-ENTMCNC: 33 G/DL (ref 33–37)
MCV RBC AUTO: 95 FL (ref 78–100)
MONOCYTES # BLD: 0.47 K/MM3 (ref 0.2–0.8)
NEUTROPHILS # BLD: 2.8 K/MM3 (ref 1.4–6.5)
PLATELET # BLD AUTO: 274 K/MM3 (ref 130–400)
PMV BLD AUTO: 10.6 FL (ref 7.4–10.4)
PROT SERPL-MCNC: 7.4 G/DL (ref 6.4–8.3)
RBC # BLD AUTO: 4.2 M/MM3 (ref 4.1–5.3)
SODIUM SERPL-SCNC: 141 MMOL/L (ref 136–145)
WBC # BLD AUTO: 4.8 K/MM3 (ref 4.8–10.8)